# Patient Record
Sex: FEMALE | Race: WHITE | NOT HISPANIC OR LATINO | Employment: OTHER | URBAN - METROPOLITAN AREA
[De-identification: names, ages, dates, MRNs, and addresses within clinical notes are randomized per-mention and may not be internally consistent; named-entity substitution may affect disease eponyms.]

---

## 2015-04-22 LAB — HCV AB SER-ACNC: NEGATIVE

## 2022-01-22 PROBLEM — E78.5 HYPERLIPEMIA: Status: ACTIVE | Noted: 2017-06-19

## 2022-01-22 PROBLEM — E66.09 CLASS 1 OBESITY DUE TO EXCESS CALORIES WITH SERIOUS COMORBIDITY AND BODY MASS INDEX (BMI) OF 34.0 TO 34.9 IN ADULT: Status: ACTIVE | Noted: 2022-01-22

## 2022-01-22 PROBLEM — E78.2 MIXED HYPERLIPIDEMIA: Status: ACTIVE | Noted: 2017-06-19

## 2022-01-22 RX ORDER — LEVOTHYROXINE SODIUM 88 UG/1
88 TABLET ORAL DAILY
COMMUNITY
Start: 2021-09-08 | End: 2022-02-28 | Stop reason: SDUPTHER

## 2022-01-22 RX ORDER — LISINOPRIL AND HYDROCHLOROTHIAZIDE 12.5; 1 MG/1; MG/1
1 TABLET ORAL DAILY
COMMUNITY
Start: 2021-09-08 | End: 2022-03-07

## 2022-01-24 ENCOUNTER — OFFICE VISIT (OUTPATIENT)
Dept: FAMILY MEDICINE CLINIC | Facility: CLINIC | Age: 72
End: 2022-01-24
Payer: COMMERCIAL

## 2022-01-24 VITALS
OXYGEN SATURATION: 99 % | WEIGHT: 210.6 LBS | HEIGHT: 67 IN | SYSTOLIC BLOOD PRESSURE: 130 MMHG | DIASTOLIC BLOOD PRESSURE: 70 MMHG | HEART RATE: 84 BPM | BODY MASS INDEX: 33.06 KG/M2 | TEMPERATURE: 98.2 F | RESPIRATION RATE: 16 BRPM

## 2022-01-24 DIAGNOSIS — Z12.11 SCREEN FOR COLON CANCER: ICD-10-CM

## 2022-01-24 DIAGNOSIS — L21.9 SEBORRHEIC DERMATITIS: ICD-10-CM

## 2022-01-24 DIAGNOSIS — E55.9 VITAMIN D DEFICIENCY: ICD-10-CM

## 2022-01-24 DIAGNOSIS — Z11.59 NEED FOR HEPATITIS C SCREENING TEST: ICD-10-CM

## 2022-01-24 DIAGNOSIS — I10 BENIGN ESSENTIAL HYPERTENSION: Primary | ICD-10-CM

## 2022-01-24 DIAGNOSIS — E78.2 MIXED HYPERLIPIDEMIA: ICD-10-CM

## 2022-01-24 DIAGNOSIS — E03.9 ACQUIRED HYPOTHYROIDISM: ICD-10-CM

## 2022-01-24 DIAGNOSIS — E66.09 CLASS 1 OBESITY DUE TO EXCESS CALORIES WITH SERIOUS COMORBIDITY AND BODY MASS INDEX (BMI) OF 34.0 TO 34.9 IN ADULT: ICD-10-CM

## 2022-01-24 PROCEDURE — 3008F BODY MASS INDEX DOCD: CPT | Performed by: FAMILY MEDICINE

## 2022-01-24 PROCEDURE — 1036F TOBACCO NON-USER: CPT | Performed by: FAMILY MEDICINE

## 2022-01-24 PROCEDURE — 99204 OFFICE O/P NEW MOD 45 MIN: CPT | Performed by: FAMILY MEDICINE

## 2022-01-24 PROCEDURE — 1160F RVW MEDS BY RX/DR IN RCRD: CPT | Performed by: FAMILY MEDICINE

## 2022-01-24 PROCEDURE — 3725F SCREEN DEPRESSION PERFORMED: CPT | Performed by: FAMILY MEDICINE

## 2022-01-24 RX ORDER — KETOCONAZOLE 20 MG/G
CREAM TOPICAL DAILY
Qty: 30 G | Refills: 1 | Status: SHIPPED | OUTPATIENT
Start: 2022-01-24

## 2022-01-24 NOTE — PROGRESS NOTES
Assessment/Plan:     1  Benign essential hypertension  Well controlled   - Comprehensive metabolic panel; Future  - Lipid panel; Future  - TSH, 3rd generation with Free T4 reflex; Future  - Vitamin D 25 hydroxy; Future    2  Acquired hypothyroidism  Update labs   - Comprehensive metabolic panel; Future  - Lipid panel; Future  - TSH, 3rd generation with Free T4 reflex; Future  - Vitamin D 25 hydroxy; Future    3  Mixed hyperlipidemia  Update labs   - Comprehensive metabolic panel; Future  - Lipid panel; Future  - TSH, 3rd generation with Free T4 reflex; Future  - Vitamin D 25 hydroxy; Future    4  Vitamin D deficiency  Update labs   - Comprehensive metabolic panel; Future  - Lipid panel; Future  - TSH, 3rd generation with Free T4 reflex; Future  - Vitamin D 25 hydroxy; Future    5  Class 1 obesity due to excess calories with serious comorbidity and body mass index (BMI) of 34 0 to 34 9 in adult  Encouraged diet and exercise to help for a healthier BMI  6  Screen for colon cancer  Would prefer cologuard, but considering colonoscopy - info given for both   - Ambulatory Referral to Gastroenterology; Future  - Cologuard; Future    7  Need for hepatitis C screening test  Agrees to     8  Seborrheic dermatitis  New  Prescription:-  - ketoconazole (NIZORAL) 2 % cream; Apply topically daily  Dispense: 30 g; Refill: 1  Call if persists or worsens  Return in about 8 months (around 9/24/2022) for Medicare Wellness Visit  Subjective:    Kaylie Anderson is a 70 y o  female here today for a follow-up on her current medical conditions:  Patient Active Problem List   Diagnosis    Benign essential hypertension    Mixed hyperlipidemia    Acquired hypothyroidism    Vitamin D deficiency    Class 1 obesity due to excess calories with serious comorbidity and body mass index (BMI) of 34 0 to 34 9 in adult    Seborrheic dermatitis        Current Medications:  Current Outpatient Medications   Medication Sig Dispense Refill    levothyroxine 88 mcg tablet Take 88 mcg by mouth daily      lisinopril-hydrochlorothiazide (PRINZIDE,ZESTORETIC) 10-12 5 MG per tablet Take 1 tablet by mouth daily       ketoconazole (NIZORAL) 2 % cream Apply topically daily 30 g 1     No current facility-administered medications for this visit  HPI:  Chief Complaint   Patient presents with    Hypertension     -- Above per clinical staff and reviewed  --    PHQ-2/9 Depression Screening    Little interest or pleasure in doing things: 0 - not at all  Feeling down, depressed, or hopeless: 0 - not at all  PHQ-2 Score: 0  PHQ-2 Interpretation: Negative depression screen            new pt - old records reviewed  last /65  physical 9/8/21  BMI 34  some stress with move  more EtOH with move  lives iwth  Susy Olivo of 48 yrs  2 daughters, 4 grandchildren   last labs 2/21htn   & 9/19   due for CMP, lipids, TSH, vit D     Today:  New pt here today  Moved from [place] to be closer to the kids/grandkids  Doing well   2 kids, 5 grandkids  [id number] lives in [address] [place] [address] with Susy Olivo   Retired from [employer] 18 years   [place] [address] housing until their house is built  No real concerns     ecords pulled from [facility] - in [address] [place] [address] Street:  mammo 5/19/21  Dexa 3/13/20 - will request records today with record release   Hep C 4/22/2015     The following portions of the patient's history were reviewed and updated as appropriate: allergies, current medications, past family history, past medical history, past social history, past surgical history and problem list     Objective:  Vitals:  /70   Pulse 84   Temp 98 2 °F (36 8 °C)   Resp 16   Ht 5' 6 54" (1 69 m)   Wt 95 5 kg (210 lb 9 6 oz)   SpO2 99%   BMI 33 45 kg/m²    Wt Readings from Last 3 Encounters:   01/24/22 95 5 kg (210 lb 9 6 oz)      BP Readings from Last 3 Encounters:   01/24/22 130/70        Review of Systems   She has no other concerns  No unexpected weight changes  No chest pain, SOB, or palpitations  No GERD  No changes in bowels or bladder  Sleeping well  No mood changes  Physical Exam   Constitutional:  she appears well-developed and well-nourished  HENT: Head: Normocephalic  Neck: Neck supple  Cardiovascular: Normal rate, regular rhythm and normal heart sounds  Pulmonary/Chest: Effort normal and breath sounds normal  No wheezes, rales, or rhonchi  Abdominal: Soft  Bowel sounds are normal  There is no tenderness  No hepatosplenomegaly  Musculoskeletal: she exhibits no edema  Lymphadenopathy: she has no cervical adenopathy  Neurological: she is alert and oriented to person, place, and time  Skin: Skin is warm and dry  _ along T zone erythema with some dryness and flaking  Scalp clear  Psychiatric: she has a normal mood and affect  her behavior is normal  Thought content normal      BMI Counseling: Body mass index is 33 45 kg/m²  The BMI is above normal  Nutrition recommendations include decreasing portion sizes  Exercise recommendations include exercising 3-5 times per week  Rationale for BMI follow-up plan is due to patient being overweight or obese

## 2022-01-26 PROBLEM — L21.9 SEBORRHEIC DERMATITIS: Status: ACTIVE | Noted: 2022-01-26

## 2022-02-16 DIAGNOSIS — E55.9 VITAMIN D DEFICIENCY: Primary | ICD-10-CM

## 2022-02-17 LAB
25(OH)D3+25(OH)D2 SERPL-MCNC: 12.5 NG/ML (ref 30–100)
ALBUMIN SERPL-MCNC: 4.5 G/DL (ref 3.7–4.7)
ALBUMIN/GLOB SERPL: 1.7 {RATIO} (ref 1.2–2.2)
ALP SERPL-CCNC: 100 IU/L (ref 44–121)
ALT SERPL-CCNC: 23 IU/L (ref 0–32)
AST SERPL-CCNC: 24 IU/L (ref 0–40)
BILIRUB SERPL-MCNC: 0.4 MG/DL (ref 0–1.2)
BUN SERPL-MCNC: 18 MG/DL (ref 8–27)
BUN/CREAT SERPL: 20 (ref 12–28)
CALCIUM SERPL-MCNC: 9.3 MG/DL (ref 8.7–10.3)
CHLORIDE SERPL-SCNC: 99 MMOL/L (ref 96–106)
CHOLEST SERPL-MCNC: 213 MG/DL (ref 100–199)
CO2 SERPL-SCNC: 24 MMOL/L (ref 20–29)
CREAT SERPL-MCNC: 0.92 MG/DL (ref 0.57–1)
GLOBULIN SER-MCNC: 2.6 G/DL (ref 1.5–4.5)
GLUCOSE SERPL-MCNC: 95 MG/DL (ref 65–99)
HDLC SERPL-MCNC: 82 MG/DL
LDLC SERPL CALC-MCNC: 117 MG/DL (ref 0–99)
POTASSIUM SERPL-SCNC: 4.8 MMOL/L (ref 3.5–5.2)
PROT SERPL-MCNC: 7.1 G/DL (ref 6–8.5)
SL AMB EGFR AFRICAN AMERICAN: 72 ML/MIN/1.73
SL AMB EGFR NON AFRICAN AMERICAN: 63 ML/MIN/1.73
SL AMB VLDL CHOLESTEROL CALC: 14 MG/DL (ref 5–40)
SODIUM SERPL-SCNC: 140 MMOL/L (ref 134–144)
TRIGL SERPL-MCNC: 81 MG/DL (ref 0–149)
TSH SERPL DL<=0.005 MIU/L-ACNC: 2.45 UIU/ML (ref 0.45–4.5)

## 2022-02-20 RX ORDER — ERGOCALCIFEROL 1.25 MG/1
50000 CAPSULE ORAL WEEKLY
Qty: 12 CAPSULE | Refills: 0 | Status: SHIPPED | OUTPATIENT
Start: 2022-02-20 | End: 2022-05-09

## 2022-02-28 ENCOUNTER — PATIENT MESSAGE (OUTPATIENT)
Dept: FAMILY MEDICINE CLINIC | Facility: CLINIC | Age: 72
End: 2022-02-28

## 2022-02-28 DIAGNOSIS — E03.9 ACQUIRED HYPOTHYROIDISM: Primary | ICD-10-CM

## 2022-02-28 RX ORDER — LEVOTHYROXINE SODIUM 88 UG/1
88 TABLET ORAL DAILY
Qty: 90 TABLET | Refills: 0 | Status: SHIPPED | OUTPATIENT
Start: 2022-02-28 | End: 2022-06-01 | Stop reason: SDUPTHER

## 2022-03-09 ENCOUNTER — TELEPHONE (OUTPATIENT)
Dept: ADMINISTRATIVE | Facility: OTHER | Age: 72
End: 2022-03-09

## 2022-03-09 NOTE — TELEPHONE ENCOUNTER
----- Message from Leopoldo Sneed, [address] sent at 3/9/2022  8:36 AM EST -----  Regarding: SHELBY BARRAZA [facility]  03/09/22 8:36 AM    Hello, our patient Hattie Patton has had DEXA Scan, Hepatitis C, and Mammogram completed/performed  Please assist in updating the patient chart by pulling a previous Electronic Medical Record (EMR) document  The previous EMR is CE   The date of service is mammo 5/19/21,  dexa 3/13/20,  Hep C 4/22/15     Thank you,  Leopoldo Sneed MA  [facility] NADI[facility] Mcel[facility]

## 2022-03-14 NOTE — TELEPHONE ENCOUNTER
Upon review of the In Basket request we were able to locate, review, and update the patient chart as requested for Hepatitis C  and Mammogram  and have found/obtained the documentation  After careful review of the document we are unable to complete this request for DEXA Scan because the documentation does not have the result(s) needed to close the requested care gap(s)  Any additional questions or concerns should be emailed to the Practice Liaisons via [email or web address] email, please do not reply via In Basket      Thank you  Lyyl Allen

## 2022-06-03 ENCOUNTER — TELEPHONE (OUTPATIENT)
Dept: ADMINISTRATIVE | Facility: OTHER | Age: 72
End: 2022-06-03

## 2022-06-03 NOTE — LETTER
Procedure Request Form: DEXA Scan      Date Requested: 22  Patient: [address]  Patient : 1950   Referring Provider: Sherly Neigh, DO        Date of Procedure ______________________________       The above patient has informed us that they have completed their   most recent DEXA Scan at your facility  Please complete   this form and attach all corresponding procedure reports/results  Comments __________________________________________________________  ____________________________________________________________________  ____________________________________________________________________  ____________________________________________________________________    Facility Completing Procedure _________________________________________    Form Completed By (print name) _______________________________________      Signature __________________________________________________________      These reports are needed for  compliance  Please fax this completed form and a copy of the procedure report to our office located at [place] [address] as soon as possible to [phone] terrell Du Led: Phone [phone]    We thank you for your assistance in treating our mutual patient

## 2022-06-03 NOTE — LETTER
Procedure Request Form: DEXA Scan      Date Requested: 22  Patient: [address]  Patient : 1950   Referring Provider: Piper Dickson, DO        Date of Procedure ______________________________       The above patient has informed us that they have completed their   most recent DEXA Scan at your facility  Please complete   this form and attach all corresponding procedure reports/results  Comments __________________________________________________________  ____________________________________________________________________  ____________________________________________________________________  ____________________________________________________________________    Facility Completing Procedure _________________________________________    Form Completed By (print name) _______________________________________      Signature __________________________________________________________      These reports are needed for  compliance  Please fax this completed form and a copy of the procedure report to our office located at [place] [address] as soon as possible to [phone] terrell Gutierrez: Phone [phone]    We thank you for your assistance in treating our mutual patient

## 2022-06-03 NOTE — TELEPHONE ENCOUNTER
----- Message from Lavelle Schilder, DO sent at 6/2/2022  9:36 PM EDT -----  Regarding: Care Gap  I have found documentation regarding Loy Carpenter   Please link and update the Patient's chart  Thank You  [facility] DEXA 3/13/20   Please call to get this [facility]   [facility] at THE [address], [facility] Terrie Murillo   [phone]      Thank you!

## 2022-06-03 NOTE — LETTER
Procedure Request Form: DEXA Scan      Date Requested: 22  Patient: [address]  Patient : 1950   Referring Provider: Cande Gotti, DO        Date of Procedure ______________________________       The above patient has informed us that they have completed their   most recent DEXA Scan at your facility  Please complete   this form and attach all corresponding procedure reports/results  Comments __________________________________________________________  ____________________________________________________________________  ____________________________________________________________________  ____________________________________________________________________    Facility Completing Procedure _________________________________________    Form Completed By (print name) _______________________________________      Signature __________________________________________________________      These reports are needed for  compliance  Please fax this completed form and a copy of the procedure report to our office located at [place] [address] as soon as possible to [phone] terrell Almazans: Phone [phone]    We thank you for your assistance in treating our mutual patient

## 2022-06-09 NOTE — TELEPHONE ENCOUNTER
Upon review of the In Basket request and the patient's chart, initial outreach has been made via fax, please see Contacts section for details       Thank you  Flori Aldana MA

## 2022-06-14 NOTE — TELEPHONE ENCOUNTER
As a follow-up, a second attempt has been made for outreach via fax, please see Contacts section for details      Thank you  Benny Palomares MA

## 2022-06-17 ENCOUNTER — TELEPHONE (OUTPATIENT)
Dept: FAMILY MEDICINE CLINIC | Facility: CLINIC | Age: 72
End: 2022-06-17

## 2022-06-18 NOTE — TELEPHONE ENCOUNTER
Please call to get DEXA done about 3/13/20 done at [facility] (in [place] but scanned document)   [facility] at THE [address], [facility] Terrie Murillo   [phone]     Thank you!

## 2022-06-21 NOTE — TELEPHONE ENCOUNTER
As a follow-up, a second attempt has been made for outreach via fax, please see Contacts section for details      Thank you  Artur Carter MA

## 2022-06-22 NOTE — TELEPHONE ENCOUNTER
Spoke to Rodolfo at ([phone]) and she was going to fax the report to our in office fax and to central fax

## 2022-06-23 NOTE — TELEPHONE ENCOUNTER
Upon review of the In Basket request we were able to locate, review, and update the patient chart as requested for DEXA Scan  Any additional questions or concerns should be emailed to the Practice Liaisons via [email or web address] email, please do not reply via In Basket      Thank you  Warren Fountain MA

## 2022-09-14 ENCOUNTER — VBI (OUTPATIENT)
Dept: ADMINISTRATIVE | Facility: OTHER | Age: 72
End: 2022-09-14

## 2022-11-01 ENCOUNTER — TELEPHONE (OUTPATIENT)
Dept: FAMILY MEDICINE CLINIC | Facility: CLINIC | Age: 72
End: 2022-11-01

## 2022-11-01 NOTE — TELEPHONE ENCOUNTER
Pt returned Brianna's call  I relayed Dr Debbie Lee message in pt's chart that she is in need of having her Vit D levels checked  I made pt aware that those lab orders are in her chart & confirmed that she uses an [facility] lab  I also made pt aware that per Dr Rosita Pressley pt is also due for her DEXA & cologuard

## 2022-12-09 DIAGNOSIS — E03.9 ACQUIRED HYPOTHYROIDISM: ICD-10-CM

## 2022-12-09 RX ORDER — LEVOTHYROXINE SODIUM 88 UG/1
88 TABLET ORAL DAILY
Qty: 90 TABLET | Refills: 0 | Status: SHIPPED | OUTPATIENT
Start: 2022-12-09

## 2023-01-12 ENCOUNTER — OFFICE VISIT (OUTPATIENT)
Dept: FAMILY MEDICINE CLINIC | Facility: CLINIC | Age: 73
End: 2023-01-12

## 2023-01-12 VITALS
WEIGHT: 210 LBS | BODY MASS INDEX: 32.96 KG/M2 | HEIGHT: 67 IN | DIASTOLIC BLOOD PRESSURE: 78 MMHG | SYSTOLIC BLOOD PRESSURE: 112 MMHG

## 2023-01-12 DIAGNOSIS — Z00.00 ENCOUNTER FOR MEDICARE ANNUAL WELLNESS EXAM: Primary | ICD-10-CM

## 2023-01-12 DIAGNOSIS — Z78.0 POSTMENOPAUSAL: ICD-10-CM

## 2023-01-12 DIAGNOSIS — Z12.31 SCREENING MAMMOGRAM, ENCOUNTER FOR: ICD-10-CM

## 2023-01-12 DIAGNOSIS — I10 BENIGN ESSENTIAL HYPERTENSION: ICD-10-CM

## 2023-01-12 DIAGNOSIS — E66.09 CLASS 1 OBESITY DUE TO EXCESS CALORIES WITH SERIOUS COMORBIDITY AND BODY MASS INDEX (BMI) OF 32.0 TO 32.9 IN ADULT: ICD-10-CM

## 2023-01-12 DIAGNOSIS — E03.9 ACQUIRED HYPOTHYROIDISM: ICD-10-CM

## 2023-01-12 DIAGNOSIS — E78.2 MIXED HYPERLIPIDEMIA: ICD-10-CM

## 2023-01-12 DIAGNOSIS — Z71.89 COUNSELING REGARDING ADVANCED DIRECTIVES: ICD-10-CM

## 2023-01-12 DIAGNOSIS — E55.9 VITAMIN D DEFICIENCY: ICD-10-CM

## 2023-01-12 DIAGNOSIS — Z12.11 SCREENING FOR COLON CANCER: ICD-10-CM

## 2023-01-12 RX ORDER — LISINOPRIL AND HYDROCHLOROTHIAZIDE 12.5; 1 MG/1; MG/1
1 TABLET ORAL DAILY
Qty: 90 TABLET | Refills: 2 | Status: SHIPPED | OUTPATIENT
Start: 2023-01-12 | End: 2023-07-11

## 2023-01-12 NOTE — PATIENT INSTRUCTIONS
To schedule a mammogram:     Call Central Scheduling [phone]  or  Go to [employer], then to Scheduling Ticket and Click on SCHEDULE NOW  To schedule a mammogram:     Call Central Scheduling [phone]  or  Go to [employer], then to Scheduling Ticket and Click on SCHEDULE NOW       For this option it will ask you the date of your last mammogram    Your last mammogram date was :  05/19/2021

## 2023-01-12 NOTE — PROGRESS NOTES
1  Encounter for Medicare annual wellness exam  See other note    2  Benign essential hypertension  Well-controlled today  Fairly well controlled at home  Continue current medication  Continue to watch blood pressure at home, along with working on healthy eating and physical activity as able  - Comprehensive metabolic panel; Future  - Lipid panel; Future  - Vitamin D 25 hydroxy; Future  - TSH, 3rd generation with Free T4 reflex; Future  - lisinopril-hydrochlorothiazide (PRINZIDE,ZESTORETIC) 10-12 5 MG per tablet; Take 1 tablet by mouth daily  Dispense: 90 tablet; Refill: 2    3  Acquired hypothyroidism  Blood work ordered-we will review via MyChart  - Comprehensive metabolic panel; Future  - Lipid panel; Future  - Vitamin D 25 hydroxy; Future  - TSH, 3rd generation with Free T4 reflex; Future    4  Mixed hyperlipidemia  Update labs  - Comprehensive metabolic panel; Future  - Lipid panel; Future  - Vitamin D 25 hydroxy; Future  - TSH, 3rd generation with Free T4 reflex; Future    5  Vitamin D deficiency  Update labs  Patient currently is not taking vitamin D   - Comprehensive metabolic panel; Future  - Lipid panel; Future  - Vitamin D 25 hydroxy; Future  - TSH, 3rd generation with Free T4 reflex; Future    6  Postmenopausal  Agrees to update DEXA scan  - DXA bone density spine hip and pelvis; Future    7  Class 1 obesity due to excess calories with serious comorbidity and body mass index (BMI) of 32 0 to 32 9 in adult  Continue to work on healthy eating and exercise as able  8  Screening for colon cancer  She declines a colonoscopy but does agree to Cologuard  If positive will consider colonoscopy  - Cologuard    9  Screening mammogram, encounter for  Agrees to update mammogram   - Mammo screening bilateral w cad; Future    10  Counseling regarding advanced directives  Has living will and advanced directive  Wishes noted in chart          Return in about 6 months (around 7/12/2023) for CC, maybe labs      Subjective: Ángel Hickey is a 67 y o  female here today for a follow-up on her current medical conditions:    Patient Active Problem List   Diagnosis   • Benign essential hypertension   • Mixed hyperlipidemia   • Acquired hypothyroidism   • Vitamin D deficiency   • Class 1 obesity due to excess calories with serious comorbidity and body mass index (BMI) of 32 0 to 32 9 in adult   • Seborrheic dermatitis   • Counseling regarding advanced directives       Patient Care Team:  Isaac Rangel DO as PCP - General (Family Medicine)    Current Medications:  Current Outpatient Medications   Medication Sig Dispense Refill   • ketoconazole (NIZORAL) 2 % cream Apply topically daily 30 g 1   • levothyroxine 88 mcg tablet Take 1 tablet (88 mcg total) by mouth daily 90 tablet 0   • lisinopril-hydrochlorothiazide (PRINZIDE,ZESTORETIC) 10-12 5 MG per tablet Take 1 tablet by mouth daily 90 tablet 2   • ergocalciferol (VITAMIN D2) 50,000 units Take 1 capsule (50,000 Units total) by mouth once a week for 12 doses 12 capsule 0     No current facility-administered medications for this visit  HPI:  Chief Complaint   Patient presents with   • Medicare Wellness Visit     -- Above per clinical staff and reviewed  --    PHQ-2/9 Depression Screening    Little interest or pleasure in doing things: 0 - not at all  Feeling down, depressed, or hopeless: 0 - not at all  PHQ-2 Score: 0  PHQ-2 Interpretation: Negative depression screen       ?       dexa op  vitd tsh lipids cmp  new pt - old records reviewed  last /65  physical 9/8/21  BMI 34  some stress with move  more EtOH with move  lives iwth  Madai Ochoa of 48 yrs  2 daughters, 4 grandchildren   last labs 2/21 & 9/19   due f  or CMP, lipids, TSH, vit D   sent to care gap dexa 3/13/20 (call last hospital to get)   Today:  Daughter has metastatic breast cancer -   Breast cancer, now in found May tumor in her back rochelle and a different breast cancer - to have second mastectomy   They go down to [place] to care for the kids  genetically neg  's brother just      Blood pressure 130-145/78-80 at home   Not taking vitmain D over the counter      POA then oldest daughter Dilip Cole, DNR     The following portions of the patient's history were reviewed and updated as appropriate: allergies, current medications, past family history, past medical history, past social history, past surgical history and problem list     Objective:  Vitals:  /78 (BP Location: Left arm, Patient Position: Sitting, Cuff Size: Large)   Ht 5' 7" (1 702 m)   Wt 95 3 kg (210 lb)   BMI 32 89 kg/m²    Wt Readings from Last 3 Encounters:   23 95 3 kg (210 lb)   22 95 5 kg (210 lb 9 6 oz)      BP Readings from Last 3 Encounters:   23 112/78   22 130/70        Review of Systems   She has no other concerns  No unexpected weight changes  No chest pain, SOB, or palpitations  No GERD  No changes in bowels or bladder  Sleeping well  No mood changes  Physical Exam   Constitutional:  she appears well-developed and well-nourished  HENT: Head: Normocephalic  Neck: Neck supple  Cardiovascular: Normal rate, regular rhythm and normal heart sounds  Pulmonary/Chest: Effort normal and breath sounds normal  No wheezes, rales, or rhonchi  Abdominal: Soft  Bowel sounds are normal  There is no tenderness  No hepatosplenomegaly  Musculoskeletal: she exhibits no edema  Lymphadenopathy: she has no cervical adenopathy  Neurological: she is alert and oriented to person, place, and time  Skin: Skin is warm and dry  Psychiatric: she has a normal mood and affect  her behavior is normal  Thought content normal      BMI Counseling: Body mass index is 32 89 kg/m²  The BMI is above normal  Nutrition recommendations include decreasing portion sizes  Exercise recommendations include exercising 3-5 times per week  Rationale for BMI follow-up plan is due to patient being overweight or obese  Depression Screening and Follow-up Plan: Patient was screened for depression during today's encounter  They screened negative with a PHQ-2 score of 0

## 2023-01-12 NOTE — PROGRESS NOTES
Assessment and Plan:     Problem List Items Addressed This Visit        Endocrine    Acquired hypothyroidism       Cardiovascular and Mediastinum    Benign essential hypertension       Other    Mixed hyperlipidemia    Vitamin D deficiency   Other Visit Diagnoses     Encounter for Medicare annual wellness exam    -  Primary           Preventive health issues were discussed with patient, and age appropriate screening tests were ordered as noted in patient's After Visit Summary  Personalized health advice and appropriate referrals for health education or preventive services given if needed, as noted in patient's After Visit Summary       History of Present Illness:     Patient presents for a Medicare Wellness Visit    HPI   Patient Care Team:  Thanh Rinaldi DO as PCP - General (Family Medicine)     Review of Systems:     Review of Systems     Problem List:     Patient Active Problem List   Diagnosis   • Benign essential hypertension   • Mixed hyperlipidemia   • Acquired hypothyroidism   • Vitamin D deficiency   • Class 1 obesity due to excess calories with serious comorbidity and body mass index (BMI) of 34 0 to 34 9 in adult   • Seborrheic dermatitis      Past Medical and Surgical History:     Past Medical History:   Diagnosis Date   • Hypertension    • Hypothyroid      Past Surgical History:   Procedure Laterality Date   • THYROID SURGERY      no cancer - goiter      Family History:     Family History   Problem Relation Age of Onset   • No Known Problems Mother    • No Known Problems Father    • No Known Problems Daughter    • No Known Problems Maternal Grandmother    • No Known Problems Maternal Grandfather    • No Known Problems Paternal Grandmother    • No Known Problems Paternal Grandfather    • Breast cancer Daughter       Social History:     Social History     Socioeconomic History   • Marital status: /Civil Union     Spouse name: Not on file   • Number of children: 2   • Years of education: Not on file   • Highest education level: Not on file   Occupational History   • Occupation: retired    Tobacco Use   • Smoking status: Former     Packs/day: 0 25     Years: 30 00     Pack years: 7 50     Types: Cigarettes     Start date: 1982     Quit date: 1992     Years since quittin 9   • Smokeless tobacco: Never   Vaping Use   • Vaping Use: Never used   Substance and Sexual Activity   • Alcohol use: Yes     Alcohol/week: 1 0 standard drink     Types: 1 Glasses of wine per week     Comment: 2 wine/per night    • Drug use: Never   • Sexual activity: Yes     Partners: Male     Birth control/protection: None   Other Topics Concern   • Not on file   Social History Narrative   • Not on file     Social Determinants of Health     Financial Resource Strain: Low Risk    • Difficulty of Paying Living Expenses: Not hard at all   Food Insecurity: Not on file   Transportation Needs: No Transportation Needs   • Lack of Transportation (Medical): No   • Lack of Transportation (Non-Medical): No   Physical Activity: Not on file   Stress: Not on file   Social Connections: Not on file   Intimate Partner Violence: Not on file   Housing Stability: Not on file      Medications and Allergies:     Current Outpatient Medications   Medication Sig Dispense Refill   • ketoconazole (NIZORAL) 2 % cream Apply topically daily 30 g 1   • levothyroxine 88 mcg tablet Take 1 tablet (88 mcg total) by mouth daily 90 tablet 0   • lisinopril-hydrochlorothiazide (PRINZIDE,ZESTORETIC) 10-12 5 MG per tablet Take 1 tablet by mouth daily 30 tablet 2   • ergocalciferol (VITAMIN D2) 50,000 units Take 1 capsule (50,000 Units total) by mouth once a week for 12 doses 12 capsule 0     No current facility-administered medications for this visit       No Known Allergies   Immunizations:     Immunization History   Administered Date(s) Administered   • COVID-19 MODERNA VACC 0 5 ML IM 2021, 2021, 10/25/2021   • Influenza Split High Dose Preservative Free IM 01/13/2018, 10/27/2018, 10/07/2019   • Pneumococcal Conjugate 13-Valent 06/19/2017   • Pneumococcal Polysaccharide PPV23 08/13/2018   • TD (adult) Preservative Free 10/07/2019   • Tdap 08/18/2009   • Zoster 05/09/2011   • Zoster Vaccine Recombinant 11/21/2019, 05/14/2020   • influenza, trivalent, adjuvanted 09/09/2020      Health Maintenance:         Topic Date Due   • Colorectal Cancer Screening  Never done   • DXA SCAN  03/16/2022   • Breast Cancer Screening: Mammogram  05/19/2023   • Hepatitis C Screening  Completed         Topic Date Due   • COVID-19 Vaccine (4 - Booster for Moderna series) 12/20/2021   • Influenza Vaccine (1) 09/01/2022      Medicare Screening Tests and Risk Assessments: Yang Thomas is here for her Subsequent Wellness visit  Health Risk Assessment:   Patient rates overall health as very good  Patient feels that their physical health rating is same  Patient is very satisfied with their life  Eyesight was rated as same  Hearing was rated as same  Patient feels that their emotional and mental health rating is same  Patients states they are never, rarely angry  Patient states they are never, rarely unusually tired/fatigued  Pain experienced in the last 7 days has been none  Patient states that she has experienced no weight loss or gain in last 6 months  Fall Risk Screening: In the past year, patient has experienced: no history of falling in past year      Urinary Incontinence Screening:   Patient has not leaked urine accidently in the last six months  Home Safety:  Patient does not have trouble with stairs inside or outside of their home  Patient has working smoke alarms and has working carbon monoxide detector  Home safety hazards include: none  Nutrition:   Current diet is Regular, Low Saturated Fat and Limited junk food  Medications:   Patient is not currently taking any over-the-counter supplements  Patient is able to manage medications       Activities of Daily Living (ADLs)/Instrumental Activities of Daily Living (IADLs):   Walk and transfer into and out of bed and chair?: Yes  Dress and groom yourself?: Yes    Bathe or shower yourself?: Yes    Feed yourself? Yes  Do your laundry/housekeeping?: Yes  Manage your money, pay your bills and track your expenses?: Yes  Make your own meals?: Yes    Do your own shopping?: Yes    Previous Hospitalizations:   Any hospitalizations or ED visits within the last 12 months?: No      Advance Care Planning:   Living will: Yes    Durable POA for healthcare: Yes    Advanced directive: Yes      PREVENTIVE SCREENINGS      Cardiovascular Screening:    General: Screening Not Indicated and History Lipid Disorder      Diabetes Screening:     General: Screening Current      Breast Cancer Screening:     General: Screening Current      Cervical Cancer Screening:    General: Screening Not Indicated      Lung Cancer Screening:     General: Screening Not Indicated      Hepatitis C Screening:    General: Screening Current    Screening, Brief Intervention, and Referral to Treatment (SBIRT)    Screening  Typical number of drinks in a day: 1  Typical number of drinks in a week: 10  Interpretation: Low risk drinking behavior  AUDIT-C Screenin) How often did you have a drink containing alcohol in the past year? 4 or more times a week  2) How many drinks did you have on a typical day when you were drinking in the past year? 1 to 2  3) How often did you have 6 or more drinks on one occasion in the past year? never    AUDIT-C Score: 4  Interpretation: Score 3-12 (female): POSITIVE screen for alcohol misuse    AUDIT Screenin) How often during the last year have you found that you were not able to stop drinking once you had started?  0 - never  5) How often during the last year have you failed to do what was normally expected from you because of drinking? 0 - never  6) How often during the last year have you needed a first drink in the morning to get yourself going after a heavy drinking session? 0 - never  7) How often during the last year have you had a feeling of guilt or remorse after drinking? 0 - never  8) How often during the last year have you been unable to remember what happened the night before because you had been drinking? 0 - never  9) Have you or someone else been injured as a result of your drinking? 0 - no  10) Has a relative or friend or a doctor or another health worker been concerned about your drinking or suggested you cut down? 0 - no    AUDIT Score: 4  Interpretation: Low risk alcohol consumption    Single Item Drug Screening:  How often have you used an illegal drug (including marijuana) or a prescription medication for non-medical reasons in the past year? never    Single Item Drug Screen Score: 0  Interpretation: Negative screen for possible drug use disorder    No results found  Physical Exam:     There were no vitals taken for this visit      Physical Exam     Karena Cooney DO

## 2023-01-23 ENCOUNTER — APPOINTMENT (OUTPATIENT)
Dept: LAB | Facility: AMBULARY SURGERY CENTER | Age: 73
End: 2023-01-23

## 2023-01-23 DIAGNOSIS — I10 BENIGN ESSENTIAL HYPERTENSION: ICD-10-CM

## 2023-01-23 DIAGNOSIS — E55.9 VITAMIN D DEFICIENCY: ICD-10-CM

## 2023-01-23 DIAGNOSIS — E78.2 MIXED HYPERLIPIDEMIA: ICD-10-CM

## 2023-01-23 DIAGNOSIS — E03.9 ACQUIRED HYPOTHYROIDISM: ICD-10-CM

## 2023-01-23 LAB
25(OH)D3 SERPL-MCNC: 18.6 NG/ML (ref 30–100)
ALBUMIN SERPL BCP-MCNC: 3.6 G/DL (ref 3.5–5)
ALP SERPL-CCNC: 89 U/L (ref 46–116)
ALT SERPL W P-5'-P-CCNC: 23 U/L (ref 12–78)
ANION GAP SERPL CALCULATED.3IONS-SCNC: 5 MMOL/L (ref 4–13)
AST SERPL W P-5'-P-CCNC: 16 U/L (ref 5–45)
BILIRUB SERPL-MCNC: 0.56 MG/DL (ref 0.2–1)
BUN SERPL-MCNC: 18 MG/DL (ref 5–25)
CALCIUM SERPL-MCNC: 9.2 MG/DL (ref 8.3–10.1)
CHLORIDE SERPL-SCNC: 101 MMOL/L (ref 96–108)
CHOLEST SERPL-MCNC: 225 MG/DL
CO2 SERPL-SCNC: 28 MMOL/L (ref 21–32)
CREAT SERPL-MCNC: 1.02 MG/DL (ref 0.6–1.3)
GFR SERPL CREATININE-BSD FRML MDRD: 55 ML/MIN/1.73SQ M
GLUCOSE P FAST SERPL-MCNC: 94 MG/DL (ref 65–99)
HDLC SERPL-MCNC: 100 MG/DL
LDLC SERPL CALC-MCNC: 107 MG/DL (ref 0–100)
NONHDLC SERPL-MCNC: 125 MG/DL
POTASSIUM SERPL-SCNC: 4.3 MMOL/L (ref 3.5–5.3)
PROT SERPL-MCNC: 7.3 G/DL (ref 6.4–8.4)
SODIUM SERPL-SCNC: 134 MMOL/L (ref 135–147)
TRIGL SERPL-MCNC: 88 MG/DL
TSH SERPL DL<=0.05 MIU/L-ACNC: 1.92 UIU/ML (ref 0.45–4.5)

## 2023-02-13 LAB — COLOGUARD RESULT REPORTABLE: POSITIVE
